# Patient Record
(demographics unavailable — no encounter records)

---

## 2024-11-29 NOTE — HISTORY OF PRESENT ILLNESS
[FreeTextEntry8] : Patient is a 59yr old male who is present today for an acute visit.  Patient c/o severe heartburn, as a result experiences SOB. Reports belching offers some relief, drinks ginger ale to facilitate belching. Pt also reports BP is high, attributes to genetics. Pt reports improvement this week. Denies burning sensation. Reports appetite is normal. Pt reports being a , speaks a lot, denies issue flaring up at work. Reports not following with medications as prescribed. Denies abdominal pain. Denies eating any spicy foods, confirms eating spaghetti. Denies SOB or dyspnea on exertion. Confirms eating relatively quickly. Denies FHx of any heart issues. Reports grandmother and uncle were Dx with lung cancer, both heavy smokers. Reports not sleeping as well the last two years than before. Reports getting Shingles seven years ago. Discussed doing an EKG, bloodwork and taking Nexium OTC. Denies drinking as much coffee, a few cups on the weekend. Discussed cutting back on sodium. Denies eating candy, reports drinking one soda a month.   Declines flu vaccine on this visit. Denies any CP, chest tightness or SOB. Denies any abdominal pain, urinary symptom, or change in bowel habits. Denies any fever, chills, or night sweats.

## 2024-11-29 NOTE — HEALTH RISK ASSESSMENT
[Yes] : Yes [No] : In the past 12 months have you used drugs other than those required for medical reasons? No [0] : 2) Feeling down, depressed, or hopeless: Not at all (0) [Never] : Never [de-identified] : social [de-identified] : walking

## 2024-11-29 NOTE — HEALTH RISK ASSESSMENT
[Yes] : Yes [No] : In the past 12 months have you used drugs other than those required for medical reasons? No [0] : 2) Feeling down, depressed, or hopeless: Not at all (0) [Never] : Never [de-identified] : social [de-identified] : walking

## 2024-11-29 NOTE — ASSESSMENT
[FreeTextEntry1] : Acute visit: Chest pain, atypical -BP is slightly elevated. Continue current management. - Check A1c. Lipid panel, vitamin levels, urine analysis, TSH, PSA Total, X-Ray Chest 2 views advised cardiology consult however suspect gi related   - RTO in 3 months   Pt verbalized understanding and will reach should any questions/concerns occur.

## 2024-11-29 NOTE — ADDENDUM
[FreeTextEntry1] : I, Geo Dominguez, acted as a scribe on behalf of Dr. Manjit Culp MD, on 11/27/2024.   All medical entries made by the scribe were at my, Dr. Manjit Culp MD, direction and personally dictated by me on 11/27/2024. I have reviewed the chart and agree that the record accurately reflects my personal performance of the history, physical exam, assessment and plan. I have also personally directed, reviewed, and agreed with the chart.

## 2025-02-06 NOTE — REVIEW OF SYSTEMS
[Fatigue] : fatigue [Night Sweats] : night sweats [Nasal Discharge] : nasal discharge [Negative] : Heme/Lymph

## 2025-02-07 NOTE — HISTORY OF PRESENT ILLNESS
[FreeTextEntry1] : Patient is present today to establish care and for a comprehensive Annual Physical Exam. [de-identified] : Patient is a 60yr old male who is present today to establish care and for a comprehensive Annual Physical Exam.  Patient c/o fatigue which onset on Sunday, reports going to bed very early that night. Pt reports coughing onset on subsequent Monday, denies fever. Reports onset of nasal discharge Tuesday night, reports taking sinus medication (unspecified). Denies headaches, body aches or sore throats. Pt reports appetite has varied over the last few days. Confirms experiencing drenching night sweats. Denies anyone else in immediate environment experiencing symptoms except wife. Confirms being very stressed and active before onset of symptoms. Pt reports feeling improvement today as opposed to yesterday. Recommended to try Advil Sinus or Mucinex.   Swab, oral consent received, sample collected. Rapid flu, sample collected.  Denies any CP, chest tightness or SOB. Denies any abdominal pain, urinary symptom, or change in bowel habits. Denies any fever, chills, or night sweats. [FreeTextEntry8] : Patient is a 60yr old male who is present today for an acute visit.  Patient c/o fatigue which onset on Sunday, reports going to bed very early that night. Pt reports coughing onset on subsequent Monday, denies fever. Reports onset of nasal discharge Tuesday night, reports taking sinus medication (unspecified). Denies headaches, body aches or sore throats. Pt reports appetite has varied over the last few days. Confirms experiencing drenching night sweats. Denies anyone else in immediate environment experiencing symptoms except wife. Confirms being very stressed and active before onset of symptoms. Pt reports feeling improvement today as opposed to yesterday. Recommended to try Advil Sinus or Mucinex.   Swab, oral consent received, sample collected. Rapid flu, sample collected.

## 2025-02-07 NOTE — HEALTH RISK ASSESSMENT
[Good] : ~his/her~  mood as  good [NO] : No [Patient reported colonoscopy was normal] : Patient reported colonoscopy was normal [Yes] : Yes [No] : In the past 12 months have you used drugs other than those required for medical reasons? No [0] : 2) Feeling down, depressed, or hopeless: Not at all (0) [PHQ-2 Negative - No further assessment needed] : PHQ-2 Negative - No further assessment needed [Never] : Never [FreeTextEntry1] : health maintenance [BoneDensityComments] : n/a [ColonoscopyDate] : 06/21 [ColonoscopyComments] : Repeat in 10 years for screening purposes.  [de-identified] : social [[id number]] : 0

## 2025-02-07 NOTE — HISTORY OF PRESENT ILLNESS
[FreeTextEntry1] : Patient is present today to establish care and for a comprehensive Annual Physical Exam. [de-identified] : Patient is a 60yr old male who is present today to establish care and for a comprehensive Annual Physical Exam.  Patient c/o fatigue which onset on Sunday, reports going to bed very early that night. Pt reports coughing onset on subsequent Monday, denies fever. Reports onset of nasal discharge Tuesday night, reports taking sinus medication (unspecified). Denies headaches, body aches or sore throats. Pt reports appetite has varied over the last few days. Confirms experiencing drenching night sweats. Denies anyone else in immediate environment experiencing symptoms except wife. Confirms being very stressed and active before onset of symptoms. Pt reports feeling improvement today as opposed to yesterday. Recommended to try Advil Sinus or Mucinex.   Swab, oral consent received, sample collected. Rapid flu, sample collected.  Denies any CP, chest tightness or SOB. Denies any abdominal pain, urinary symptom, or change in bowel habits. Denies any fever, chills, or night sweats. [FreeTextEntry8] : Patient is a 60yr old male who is present today for an acute visit.  Patient c/o fatigue which onset on Sunday, reports going to bed very early that night. Pt reports coughing onset on subsequent Monday, denies fever. Reports onset of nasal discharge Tuesday night, reports taking sinus medication (unspecified). Denies headaches, body aches or sore throats. Pt reports appetite has varied over the last few days. Confirms experiencing drenching night sweats. Denies anyone else in immediate environment experiencing symptoms except wife. Confirms being very stressed and active before onset of symptoms. Pt reports feeling improvement today as opposed to yesterday. Recommended to try Advil Sinus or Mucinex.   Swab, oral consent received, sample collected. Rapid flu, sample collected.

## 2025-02-07 NOTE — HEALTH RISK ASSESSMENT
[Good] : ~his/her~  mood as  good [NO] : No [Patient reported colonoscopy was normal] : Patient reported colonoscopy was normal [Yes] : Yes [No] : In the past 12 months have you used drugs other than those required for medical reasons? No [0] : 2) Feeling down, depressed, or hopeless: Not at all (0) [PHQ-2 Negative - No further assessment needed] : PHQ-2 Negative - No further assessment needed [Never] : Never [FreeTextEntry1] : health maintenance [BoneDensityComments] : n/a [ColonoscopyDate] : 06/21 [ColonoscopyComments] : Repeat in 10 years for screening purposes.  [de-identified] : social [[id number]] : 0

## 2025-02-07 NOTE — ASSESSMENT
[FreeTextEntry1] : Acute visit: Viral syndrome -BP is stable. Continue current management. - Order Flu Panel-overall improving, advised mucinex   - RTO in 3 months   Pt verbalized understanding and will reach should any questions/concerns occur.

## 2025-02-07 NOTE — HEALTH RISK ASSESSMENT
[Good] : ~his/her~  mood as  good [NO] : No [Patient reported colonoscopy was normal] : Patient reported colonoscopy was normal [Yes] : Yes [No] : In the past 12 months have you used drugs other than those required for medical reasons? No [0] : 2) Feeling down, depressed, or hopeless: Not at all (0) [PHQ-2 Negative - No further assessment needed] : PHQ-2 Negative - No further assessment needed [Never] : Never [FreeTextEntry1] : health maintenance [BoneDensityComments] : n/a [ColonoscopyDate] : 06/21 [ColonoscopyComments] : Repeat in 10 years for screening purposes.  [de-identified] : social [[id number]] : 0

## 2025-02-07 NOTE — ADDENDUM
[FreeTextEntry1] : I, Geo Dominguez, acted as a scribe on behalf of Dr. Manjit Culp MD, on 02/06/2025.   All medical entries made by the scribe were at my, Dr. Manjit Culp MD, direction and personally dictated by me on 02/06/2025. I have reviewed the chart and agree that the record accurately reflects my personal performance of the history, physical exam, assessment and plan. I have also personally directed, reviewed, and agreed with the chart.

## 2025-02-07 NOTE — HISTORY OF PRESENT ILLNESS
[FreeTextEntry1] : Patient is present today to establish care and for a comprehensive Annual Physical Exam. [de-identified] : Patient is a 60yr old male who is present today to establish care and for a comprehensive Annual Physical Exam.  Patient c/o fatigue which onset on Sunday, reports going to bed very early that night. Pt reports coughing onset on subsequent Monday, denies fever. Reports onset of nasal discharge Tuesday night, reports taking sinus medication (unspecified). Denies headaches, body aches or sore throats. Pt reports appetite has varied over the last few days. Confirms experiencing drenching night sweats. Denies anyone else in immediate environment experiencing symptoms except wife. Confirms being very stressed and active before onset of symptoms. Pt reports feeling improvement today as opposed to yesterday. Recommended to try Advil Sinus or Mucinex.   Swab, oral consent received, sample collected. Rapid flu, sample collected.  Denies any CP, chest tightness or SOB. Denies any abdominal pain, urinary symptom, or change in bowel habits. Denies any fever, chills, or night sweats. [FreeTextEntry8] : Patient is a 60yr old male who is present today for an acute visit.  Patient c/o fatigue which onset on Sunday, reports going to bed very early that night. Pt reports coughing onset on subsequent Monday, denies fever. Reports onset of nasal discharge Tuesday night, reports taking sinus medication (unspecified). Denies headaches, body aches or sore throats. Pt reports appetite has varied over the last few days. Confirms experiencing drenching night sweats. Denies anyone else in immediate environment experiencing symptoms except wife. Confirms being very stressed and active before onset of symptoms. Pt reports feeling improvement today as opposed to yesterday. Recommended to try Advil Sinus or Mucinex.   Swab, oral consent received, sample collected. Rapid flu, sample collected.